# Patient Record
Sex: MALE | Race: WHITE | NOT HISPANIC OR LATINO | Employment: UNEMPLOYED | URBAN - METROPOLITAN AREA
[De-identification: names, ages, dates, MRNs, and addresses within clinical notes are randomized per-mention and may not be internally consistent; named-entity substitution may affect disease eponyms.]

---

## 2019-07-10 ENCOUNTER — APPOINTMENT (OUTPATIENT)
Dept: GENERAL RADIOLOGY | Facility: HOSPITAL | Age: 54
End: 2019-07-10

## 2019-07-10 ENCOUNTER — HOSPITAL ENCOUNTER (EMERGENCY)
Facility: HOSPITAL | Age: 54
Discharge: HOME | End: 2019-07-10
Attending: EMERGENCY MEDICINE | Admitting: EMERGENCY MEDICINE

## 2019-07-10 VITALS
TEMPERATURE: 98 F | HEART RATE: 106 BPM | DIASTOLIC BLOOD PRESSURE: 87 MMHG | SYSTOLIC BLOOD PRESSURE: 141 MMHG | OXYGEN SATURATION: 98 % | RESPIRATION RATE: 16 BRPM | WEIGHT: 178 LBS | BODY MASS INDEX: 28.61 KG/M2 | HEIGHT: 66 IN

## 2019-07-10 DIAGNOSIS — S63.502A SPRAIN OF LEFT WRIST, INITIAL ENCOUNTER: Primary | ICD-10-CM

## 2019-07-10 PROCEDURE — 99283 EMERGENCY DEPT VISIT LOW MDM: CPT

## 2019-07-10 PROCEDURE — 73110 X-RAY EXAM OF WRIST: CPT

## 2019-07-10 RX ORDER — HYDROCODONE BITARTRATE AND ACETAMINOPHEN 5; 325 MG/1; MG/1
1 TABLET ORAL ONCE
Status: DISCONTINUED | OUTPATIENT
Start: 2019-07-10 | End: 2019-07-10 | Stop reason: HOSPADM

## 2019-07-10 RX ORDER — NAPROXEN 375 MG/1
375 TABLET ORAL 2 TIMES DAILY PRN
Qty: 14 TABLET | Refills: 0 | Status: SHIPPED | OUTPATIENT
Start: 2019-07-10

## 2019-07-10 NOTE — ED PROVIDER NOTES
"Subjective   Neo Rubio is a 54 y.o. male presenting to the emergency department complaining of pain in his left wrist. He notes that he has fractured his wrist 4 times previously. He reports that he was picking up a pickaxe 3 weeks ago when he aggravated the injury. He also notes that he \"popped\" it this morning while raising himself up in bed, prompting presentation to the ED. He notes associated numbness in his left fingers but denies any fever, vomiting, or redness. He has no other notable medical problems. He is a nonsmoker and denies any alcohol or illicit drug use. There are no other acute complaints at this time.                History provided by:  Patient  Hand Pain   Location:  Left wrist  Severity:  Moderate  Onset quality:  Gradual  Duration:  3 weeks  Timing:  Constant  Progression:  Unchanged  Chronicity:  New  Context:  History of fracture  Relieved by:  None tried  Worsened by:  Nothing  Ineffective treatments:  None tried  Associated symptoms: no fever, no nausea and no vomiting        Review of Systems   Constitutional: Negative for chills and fever.   Gastrointestinal: Negative for nausea and vomiting.   Musculoskeletal: Positive for arthralgias. Negative for joint swelling.   Skin: Negative for color change and wound.   All other systems reviewed and are negative.      History reviewed. No pertinent past medical history.    Allergies   Allergen Reactions   • Morphine Other (See Comments)     CHEST PAIN       Past Surgical History:   Procedure Laterality Date   • APPENDECTOMY     • HERNIA REPAIR         History reviewed. No pertinent family history.    Social History     Socioeconomic History   • Marital status:      Spouse name: Not on file   • Number of children: Not on file   • Years of education: Not on file   • Highest education level: Not on file   Tobacco Use   • Smoking status: Never Smoker   • Smokeless tobacco: Never Used   Substance and Sexual Activity   • Alcohol use: No " "    Frequency: Never   • Drug use: No         Objective   Physical Exam   Constitutional: He is oriented to person, place, and time. He appears well-developed and well-nourished. No distress.   HENT:   Head: Normocephalic and atraumatic.   Nose: Nose normal.   Eyes: Conjunctivae are normal. No scleral icterus.   Neck: Normal range of motion. Neck supple.   Cardiovascular: Normal rate, regular rhythm and normal heart sounds. Exam reveals no gallop and no friction rub.   No murmur heard.  Pulmonary/Chest: Effort normal and breath sounds normal. No stridor. No respiratory distress. He has no wheezes. He has no rales.   Musculoskeletal: Normal range of motion.   Limited range of motion and tenderness in left lateral wrist, medial to the anatomical snuff box. FROM. Neurovascuarly intact.   Neurological: He is alert and oriented to person, place, and time.   Skin: Skin is warm and dry. No rash noted.   Psychiatric: He has a normal mood and affect. His behavior is normal.   Nursing note and vitals reviewed.      Procedures         ED Course       No results found for this or any previous visit (from the past 24 hour(s)).  Note: In addition to lab results from this visit, the labs listed above may include labs taken at another facility or during a different encounter within the last 24 hours. Please correlate lab times with ED admission and discharge times for further clarification of the services performed during this visit.    XR Wrist 3+ View Right   Preliminary Result   No acute bony abnormality identified.       D:  07/10/2019   E:  07/10/2019                Vitals:    07/10/19 1554   BP: 141/87   BP Location: Right arm   Patient Position: Sitting   Pulse: 106   Resp: 16   Temp: 98 °F (36.7 °C)   TempSrc: Oral   SpO2: 98%   Weight: 80.7 kg (178 lb)   Height: 167.6 cm (66\")     Medications - No data to display  ECG/EMG Results (last 24 hours)     ** No results found for the last 24 hours. **        No orders to display "                     MDM  Number of Diagnoses or Management Options  Sprain of left wrist, initial encounter:   Diagnosis management comments: Presents complaining of acute on chronic wrist pain.  He has a history of multiple fractures.  Differential diagnosis includes sprain, fracture, chronic pain.  Patient has a negative x-ray.  He was placed in a thumb spica splint and given follow-up with hand surgeon.  He is given return precautions and is agreeable to plan.      Final diagnoses:   Sprain of left wrist, initial encounter       Documentation assistance provided by gerda Medina.  Information recorded by the scribe was done at my direction and has been verified and validated by me.     Cheri Medina  07/10/19 1723       Vanessa Knox PA-C  07/11/19 0006

## 2019-07-10 NOTE — DISCHARGE INSTRUCTIONS
You have been seen for wrist pain.  Please return here for any fever, redness, numb or cold fingers.  Please follow-up with hand surgeon.